# Patient Record
Sex: MALE | Race: WHITE | URBAN - METROPOLITAN AREA
[De-identification: names, ages, dates, MRNs, and addresses within clinical notes are randomized per-mention and may not be internally consistent; named-entity substitution may affect disease eponyms.]

---

## 2017-02-05 ENCOUNTER — TRANSFERRED RECORDS (OUTPATIENT)
Dept: HEALTH INFORMATION MANAGEMENT | Facility: CLINIC | Age: 62
End: 2017-02-05

## 2017-03-24 ENCOUNTER — RECORDS - HEALTHEAST (OUTPATIENT)
Dept: LAB | Facility: CLINIC | Age: 62
End: 2017-03-24

## 2017-03-24 LAB
CREAT SERPL-MCNC: 1.2 MG/DL (ref 0.7–1.3)
GFR SERPL CREATININE-BSD FRML MDRD: >60 ML/MIN/1.73M2

## 2017-07-06 ENCOUNTER — HOSPITAL ENCOUNTER (OUTPATIENT)
Dept: PHYSICAL THERAPY | Facility: CLINIC | Age: 62
Setting detail: THERAPIES SERIES
End: 2017-07-06
Attending: FAMILY MEDICINE
Payer: COMMERCIAL

## 2017-07-06 PROCEDURE — 97162 PT EVAL MOD COMPLEX 30 MIN: CPT | Mod: GP | Performed by: PHYSICAL THERAPIST

## 2017-07-06 PROCEDURE — 97110 THERAPEUTIC EXERCISES: CPT | Mod: GP | Performed by: PHYSICAL THERAPIST

## 2017-07-06 PROCEDURE — T1013 SIGN LANG/ORAL INTERPRETER: HCPCS | Mod: U3 | Performed by: PHYSICAL THERAPIST

## 2017-07-06 PROCEDURE — 40000719 ZZHC STATISTIC PT DEPARTMENT NEURO VISIT: Performed by: PHYSICAL THERAPIST

## 2017-07-07 NOTE — PROGRESS NOTES
07/06/17 1300   Quick Adds   Quick Adds Certification  (Medicaid [place])   Type of Visit Initial OP PT Evaluation       Present Yes  (Daughter here as well and speaks english/Romansh)   Language Mongolian   General Information   Start of Care Date 07/06/17   Referring Physician Dr. Fabienne Wallace   Orders Evaluate and Treat as Indicated   Additional Orders 12 visits   Order Date 06/08/17   Medical Diagnosis Interspinal Abscess and granuloma, incomplete paraplegia   Onset of illness/injury or Date of Surgery 02/05/17  (hospital admit)   Precautions/Limitations no known precautions/limitations   Surgical/Medical history reviewed Yes   Pertinent history of current problem (include personal factors and/or comorbidities that impact the POC) 60 yo male who was admitted to the hospital on 2-5-17 and was discharged on 2-21-17 secondary to a spinal abscess and possible psoas abscess. He had laminectomies to get to the site of infection at T2-6. In addition, he has a history of a lipoma in the R upper back. He was then seen at [facility] for rehabilitation. ACcording to his daughter, He was using a standing frame. He is starting to getmovement in the L toes and occasionally in the L ankle DFs. He uses a wheelchair and can transfer himself using the slide board. He gets himself into bed at night using a belt around his knees. He has assistance with leg exercises including hip abduction and adduction, knee and hip flexion and extension. He also had been workingon arm strengthening at [facility]. He is living with his daughter and her  and there is a ramp into the home. He lives on the main level. They do not have a stander at home. His daughter understands the importance of intense rehab within the first yr.    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 Rambler - everything on one floor   Current Assistive Devices Manual Wheel Chair   ADL Devices Shower/Tub Chair   Assistive Devices Comments none   Patient/Family Goals Statement Be able to walk, likes his job   Fall Risk Screen   Fall screen completed by PT   Per patient - Fall 2 or more times in past year? No   Per patient - Fall with injury in past year? No   Is patient a fall risk? Yes;Department fall risk interventions implemented   Fall screen comments Due lesion of spine   Pain   Patient currently in pain Yes   Pain location Area of the abscess removal   Pain rating now: 8/10, 3-4/10 sometimes - 10/10   Cognitive Status Examination   Orientation orientation to person, place and time   Follows Commands and Answers Questions 100% of the time   Personal Safety and Judgment intact   Memory intact   Observation   Observation no acute distress in manual wheel chair   Integumentary   Integumentary Comments well healed incision   Posture   Posture Comments fair trunk control in sitting.    Strength   Strength Comments decreased R shoulder ER 3-/5 due to pain, flexion, abduction, IR equal and 4+ to 5-/5. unable to contract R LE muscles. Able to wiggle L toes    Bed Mobility   Bed Mobility Comments needs equipment/person (apparently better at home due to wider bed vs plinth)   Transfer Skills   Transfer Comments independent with slide board transfer bed<->chair. His daughter emphasizes the need for detailed procedure   Locomotion   Wheel Chair Mobility Comments independent in dept   Planned Therapy Interventions   Planned Therapy Interventions Comment Work on strength and ROM to allow standing with progression to walking as he is able   Clinical Impression   Criteria for Skilled Therapeutic Interventions Met yes, treatment indicated   PT  Diagnosis incomplete paraplegia with difficulty standing   Influenced by the following impairments in ability to control legs   Functional limitations due to impairments walking, standing, bed mobility    Clinical Presentation Stable/Uncomplicated   Clinical Presentation Rationale neural injury, noting improvement   Clinical Decision Making (Complexity) Low complexity   Therapy Frequency 1 time/week   Predicted Duration of Therapy Intervention (days/wks) 12 visits/weeks   Risk & Benefits of therapy have been explained Yes   Patient, Family & other staff in agreement with plan of care Yes   Clinical Impression Comments 60 yo male here with his daughter. They want to be aggressive and push into standing and walking. I had recommended working on core, weight shifting, balance and bed mobility as well and they feel this is not needed at this time. We will ask for consent to get records from [facility] to work into goals. Will also recommend practicing trunk control at least in sitting to improve standing once ready.    Education Assessment   Preferred Learning Style Listening   Barriers to Learning Language  (has  present at session)   GOALS   PT Eval Goals 1   Goal 1   Goal Identifier Home program   Goal Description With assistance, Alireza will be able to complete a home program to guide him to standing with maximum assist of 1 safely with progression to as much independence as he is able   Target Date 09/29/17   Total Evaluation Time   Total Evaluation Time (Minutes) 30   Therapy Certification   Certification date from 07/06/17   Certification date to 09/28/17   Medical Diagnosis Interspinal Abscess and granuloma, incomplete paraplegia   Certification I certify the need for these services furnished under this plan of treatment and while under my care.  (Physician co-signature of this document indicates review and certification of the therapy plan).       Thank you for referring alireza  to [facility]  Rehabilitation Services - [facility] Beatrice, PT  [phone]

## 2017-07-07 NOTE — PROGRESS NOTES
[facility]        OUTPATIENT PHYSICAL THERAPY FUNCTIONAL EVALUATION  PLAN OF TREATMENT FOR OUTPATIENT REHABILITATION  (COMPLETE FOR INITIAL CLAIMS ONLY)  Patient's Last Name, First Name, M.I.  YOB: 1955  Alireza Meyer        Provider's Name   [facility]   Medical Record No.  [id number]     Start of Care Date:  07/06/17   Onset Date:  02/05/17 (hospital admit)   Type:     _X__PT   ____OT  ____SLP Medical Diagnosis:  Interspinal Abscess and granuloma, incomplete paraplegia     PT Diagnosis:  incomplete paraplegia with difficulty standing Visits from SOC:  1                              __________________________________________________________________________________  Plan of Treatment/Functional Goals:     Work on strength and ROM to allow standing with progression to walking as he is able        GOALS  Home program  With assistance, Alireza will be able to complete a home program to guide him to standing with maximum assist of 1 safely with progression to as much independence as he is able  09/29/17       Therapy Frequency:  1 time/week   Predicted Duration of Therapy Intervention:  12 visits/weeks    Yue Barron, PT                                    I CERTIFY THE NEED FOR THESE SERVICES FURNISHED UNDER        THIS PLAN OF TREATMENT AND WHILE UNDER MY CARE .             Physician Signature               Date    X_____________________________________________________                      Certification Date From:  07/06/17   Certification Date To:  09/28/17    Referring Provider:  Dr. Fabienne Wallace    Initial Assessment  See Epic Evaluation- Start of Care Date: 07/06/17

## 2017-07-12 ENCOUNTER — TELEPHONE (OUTPATIENT)
Dept: PHYSICAL THERAPY | Facility: CLINIC | Age: 62
End: 2017-07-12

## 2017-07-13 ENCOUNTER — HOSPITAL ENCOUNTER (OUTPATIENT)
Dept: PHYSICAL THERAPY | Facility: CLINIC | Age: 62
Setting detail: THERAPIES SERIES
End: 2017-07-13
Attending: FAMILY MEDICINE
Payer: COMMERCIAL

## 2017-07-13 PROCEDURE — 97530 THERAPEUTIC ACTIVITIES: CPT | Mod: GP | Performed by: PHYSICAL THERAPIST

## 2017-07-13 PROCEDURE — 97110 THERAPEUTIC EXERCISES: CPT | Mod: GP | Performed by: PHYSICAL THERAPIST

## 2017-07-13 PROCEDURE — 97112 NEUROMUSCULAR REEDUCATION: CPT | Mod: GP | Performed by: PHYSICAL THERAPIST

## 2017-07-13 PROCEDURE — T1013 SIGN LANG/ORAL INTERPRETER: HCPCS | Mod: U3 | Performed by: PHYSICAL THERAPIST

## 2017-07-13 PROCEDURE — 40000719 ZZHC STATISTIC PT DEPARTMENT NEURO VISIT: Performed by: PHYSICAL THERAPIST

## 2017-07-17 ENCOUNTER — HOSPITAL ENCOUNTER (OUTPATIENT)
Dept: PHYSICAL THERAPY | Facility: CLINIC | Age: 62
Setting detail: THERAPIES SERIES
End: 2017-07-17
Attending: FAMILY MEDICINE
Payer: COMMERCIAL

## 2017-07-17 PROCEDURE — T1013 SIGN LANG/ORAL INTERPRETER: HCPCS | Mod: U3 | Performed by: PHYSICAL THERAPIST

## 2017-07-17 PROCEDURE — 40000719 ZZHC STATISTIC PT DEPARTMENT NEURO VISIT: Performed by: PHYSICAL THERAPIST

## 2017-07-17 PROCEDURE — 97530 THERAPEUTIC ACTIVITIES: CPT | Mod: GP | Performed by: PHYSICAL THERAPIST

## 2017-07-17 PROCEDURE — 97110 THERAPEUTIC EXERCISES: CPT | Mod: GP | Performed by: PHYSICAL THERAPIST

## 2017-07-17 NOTE — ADDENDUM NOTE
Encounter addended by: Caridad Baum PT on: 7/17/2017  9:02 AM<BR>     Actions taken: Flowsheet data copied forward, Flowsheet accepted, Charge Capture section accepted

## 2017-07-20 ENCOUNTER — HOSPITAL ENCOUNTER (OUTPATIENT)
Dept: PHYSICAL THERAPY | Facility: CLINIC | Age: 62
Setting detail: THERAPIES SERIES
End: 2017-07-20
Attending: FAMILY MEDICINE
Payer: COMMERCIAL

## 2017-07-20 ENCOUNTER — TRANSFERRED RECORDS (OUTPATIENT)
Dept: HEALTH INFORMATION MANAGEMENT | Facility: CLINIC | Age: 62
End: 2017-07-20

## 2017-07-20 PROCEDURE — T1013 SIGN LANG/ORAL INTERPRETER: HCPCS | Mod: U3 | Performed by: PHYSICAL THERAPIST

## 2017-07-20 PROCEDURE — 40000719 ZZHC STATISTIC PT DEPARTMENT NEURO VISIT: Performed by: PHYSICAL THERAPIST

## 2017-07-20 PROCEDURE — 97110 THERAPEUTIC EXERCISES: CPT | Mod: GP | Performed by: PHYSICAL THERAPIST

## 2017-07-20 PROCEDURE — 97530 THERAPEUTIC ACTIVITIES: CPT | Mod: GP | Performed by: PHYSICAL THERAPIST

## 2017-07-24 ENCOUNTER — HOSPITAL ENCOUNTER (OUTPATIENT)
Dept: PHYSICAL THERAPY | Facility: CLINIC | Age: 62
Setting detail: THERAPIES SERIES
End: 2017-07-24
Attending: FAMILY MEDICINE
Payer: COMMERCIAL

## 2017-07-24 PROCEDURE — 97110 THERAPEUTIC EXERCISES: CPT | Mod: GP | Performed by: PHYSICAL THERAPIST

## 2017-07-24 PROCEDURE — 97530 THERAPEUTIC ACTIVITIES: CPT | Mod: GP | Performed by: PHYSICAL THERAPIST

## 2017-07-24 PROCEDURE — T1013 SIGN LANG/ORAL INTERPRETER: HCPCS | Mod: U3 | Performed by: PHYSICAL THERAPIST

## 2017-07-24 PROCEDURE — 40000719 ZZHC STATISTIC PT DEPARTMENT NEURO VISIT: Performed by: PHYSICAL THERAPIST

## 2017-08-11 ENCOUNTER — HOSPITAL ENCOUNTER (OUTPATIENT)
Dept: PHYSICAL THERAPY | Facility: CLINIC | Age: 62
Setting detail: THERAPIES SERIES
End: 2017-08-11
Attending: FAMILY MEDICINE
Payer: COMMERCIAL

## 2017-08-11 PROCEDURE — 97112 NEUROMUSCULAR REEDUCATION: CPT | Mod: GP | Performed by: PHYSICAL THERAPIST

## 2017-08-11 PROCEDURE — 97110 THERAPEUTIC EXERCISES: CPT | Mod: GP | Performed by: PHYSICAL THERAPIST

## 2017-08-11 PROCEDURE — 40000719 ZZHC STATISTIC PT DEPARTMENT NEURO VISIT: Performed by: PHYSICAL THERAPIST

## 2017-08-15 ENCOUNTER — HOSPITAL ENCOUNTER (OUTPATIENT)
Dept: PHYSICAL THERAPY | Facility: CLINIC | Age: 62
Setting detail: THERAPIES SERIES
End: 2017-08-15
Attending: FAMILY MEDICINE
Payer: COMMERCIAL

## 2017-08-15 PROCEDURE — 97162 PT EVAL MOD COMPLEX 30 MIN: CPT | Mod: GP

## 2017-08-15 PROCEDURE — 40000449 ZZHC STATISTIC PT VISIT, LYMPHEDEMA

## 2017-08-15 PROCEDURE — 97112 NEUROMUSCULAR REEDUCATION: CPT | Mod: GP

## 2017-08-17 NOTE — PROGRESS NOTES
08/15/17 1050   Quick Adds   Quick Adds Certification   Rehab Discipline   Discipline PT   Type of Visit   Type of visit Initial Edema Evaluation       present Yes   Language Slovenian   General Information   Start of care 08/15/17   Referring physician Dr. 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 He has right LE lymphedema along with the issues releated to paraplegia. He has a new WC. He is not sitting in it well due to pads on the pressure releif cushion . He is sliding forward.. the cushion needs maintenance ie kneeding to ge the jell put back into place for good pressure relief.   Prior level of functional mobility Prior to infection he was indep and ambulatory working at a job.   Prior treatment Elevation   Community support Personal care attendant;Family / friend caregiver   Patient role / employment history Disabled   Living environment House / [facility]   Living environment comments lives with daughter and her    Current assistive devices Transfer board;Manual wheel chair   Assistive device comments a shower chair is being ordered and letters of medical necessities written. It takes a while   Fall Screening   Fall screen completed by PT   Per patient, fall 2 or more times in past year? No   Per patient, fall with injury in past year? No   System Outcome Measures   Outcome Measures Lymphedema   Lymphedema Life Impact Scale (score range 0-72). A higher score indicates  greater impairment. 17   Subjective Report   Patient report of symptoms He and daughter report that the swelling was much more right after the infection and now with meds and some time he has less swelling. Plus he has been elevating. He sleeps in a regular bed .   Patient / Family Goals   Patient / family goals statement get edema under control    Pain   Patient currently in pain No   Cognitive Status   Orientation Orientation to person, place and time   Level of consciousness Alert   Follows commands and answers questions 100% of the time   Personal safety and judgement Intact   Memory Intact   Edema Exam / Assessment   Scar No   Dorsal pedal pulse Decreased   Stemmer sign Negative   Ulceration Yes   Ulceration comments heel wound due to pressure, bandaged   Volume LE   Right LE (mL) 6874.11   Left LE (mL) 6051.2   LE volume comparison RLE volume greater than LLE volume   Range of Motion   ROM No deficits were identified   Strength   Strength comments UE 4+ to 5/5,  LE tone and 2/5 LE in quad.    Posture   Posture Normal   Posture comments He was not in good posture  in his new manual chair. He has a solid back and a vidal contour cushion .  his LE and trunk seem to drift to the left when he is sitting. He had a thick pad on the cushion needed for incontinence. Insructed the need for the padding to be off the pressure relief cushion. He also had a chux on  the seat.    Activities of Daily Living   Activities of Daily Living needs assist with some things . He is using a sliding board transfer technique.    Bed Mobility   Bed mobility Pt is indep in bed but he has tone that needs to be managed . Daughter does some ROM.   Transfers   Transfers sliding board transfers    Gait / Locomotion   Gait / Locomotion unable   Sensory   Sensory perception comments decreased sensation LE   Vascular Assessment   Vascular Assessment Comments none   Coordination   Coordination comments UE no problem with coordination   Muscle Tone    Muscle tone comments significant LE tone into flexion.   Planned Edema Interventions   Planned edema interventions Manual lymph drainage;Gradient compression bandaging;Fit for compression garment   Clinical Impression   Criteria for skilled therapeutic intervention met Yes   Therapy diagnosis Parapalegic positioning rehab  and lymphedema managment    Functional limitations due to impairments / conditions edema is putting him at risk for infection in edematous tissue so needs to be managed. He presently is not sitting correctly inhis new chair so will consult his WC vendor., potential for wounds from pressure   Clinical Presentation Evolving/Changing   Clinical Decision Making (Complexity) Moderate complexity   Treatment frequency (4x/weekx1 week then 1x/week)   Treatment duration 90 days   Patient / family and/or staff in agreement with plan of care Yes   Risks and benefits of therapy have been explained Yes   Education Assessment   Preferred learning style Listening;Demonstration;Pictures / video   Barriers to learning No barriers   Goals   Edema Eval Goals 1;2;3   Goal 1   Goal identifier 1   Goal description Pt will have edema reduced 10% and correct fitting garments in place.    Target date 11/11/17   Goal 2   Goal identifier 2   Goal description Pt and daughter will be indep with managing risk factors for edema plus pt will be indep wiht self manual lymph drainage.   Target date 11/11/17   Goal 3   Goal identifier 3   Goal description Pt will be in correct position in his WC to protect from further pressure areas plus he will be indep with unloading pressure areas.   Target date 11/11/17   Total Evaluation Time   Total evaluation time 45   Certification   Certification date from 08/15/17   Certification date to 11/11/17   Medical Diagnosis right leg swelling, paraplegic,   Certification I certify the need for these services furnished under this plan of treatment and while under my care.  (Physician  co-signature of this document indicates review and certification of the therapy plan).

## 2017-08-22 ENCOUNTER — HOSPITAL ENCOUNTER (OUTPATIENT)
Dept: PHYSICAL THERAPY | Facility: CLINIC | Age: 62
Setting detail: THERAPIES SERIES
End: 2017-08-22
Attending: FAMILY MEDICINE
Payer: COMMERCIAL

## 2017-08-22 PROCEDURE — 97140 MANUAL THERAPY 1/> REGIONS: CPT | Mod: GP

## 2017-08-22 PROCEDURE — 40000449 ZZHC STATISTIC PT VISIT, LYMPHEDEMA

## 2017-08-23 ENCOUNTER — HOSPITAL ENCOUNTER (OUTPATIENT)
Dept: PHYSICAL THERAPY | Facility: CLINIC | Age: 62
Setting detail: THERAPIES SERIES
End: 2017-08-23
Attending: FAMILY MEDICINE
Payer: COMMERCIAL

## 2017-08-23 PROCEDURE — 97140 MANUAL THERAPY 1/> REGIONS: CPT | Mod: GP

## 2017-08-23 PROCEDURE — 40000449 ZZHC STATISTIC PT VISIT, LYMPHEDEMA

## 2017-08-24 ENCOUNTER — HOSPITAL ENCOUNTER (OUTPATIENT)
Dept: PHYSICAL THERAPY | Facility: CLINIC | Age: 62
Setting detail: THERAPIES SERIES
End: 2017-08-24
Attending: FAMILY MEDICINE
Payer: COMMERCIAL

## 2017-08-24 PROCEDURE — 97140 MANUAL THERAPY 1/> REGIONS: CPT | Mod: GP

## 2017-08-24 PROCEDURE — 40000449 ZZHC STATISTIC PT VISIT, LYMPHEDEMA

## 2017-08-24 PROCEDURE — 97542 WHEELCHAIR MNGMENT TRAINING: CPT | Mod: GP

## 2017-08-25 ENCOUNTER — HOSPITAL ENCOUNTER (OUTPATIENT)
Dept: PHYSICAL THERAPY | Facility: CLINIC | Age: 62
Setting detail: THERAPIES SERIES
End: 2017-08-25
Attending: FAMILY MEDICINE
Payer: COMMERCIAL

## 2017-08-25 PROCEDURE — 97530 THERAPEUTIC ACTIVITIES: CPT | Mod: GP

## 2017-08-25 PROCEDURE — 97140 MANUAL THERAPY 1/> REGIONS: CPT | Mod: GP

## 2017-08-25 PROCEDURE — 40000449 ZZHC STATISTIC PT VISIT, LYMPHEDEMA

## 2017-08-25 PROCEDURE — 97542 WHEELCHAIR MNGMENT TRAINING: CPT | Mod: GP

## 2017-08-30 NOTE — ADDENDUM NOTE
Encounter addended by: Arlin Powers, PT on: 8/30/2017 11:10 AM<BR>     Actions taken: Flowsheet data copied forward, Flowsheet accepted

## 2017-08-30 NOTE — ADDENDUM NOTE
Encounter addended by: Arlin Powers, PT on: 8/30/2017 11:09 AM<BR>     Actions taken: Flowsheet data copied forward, Flowsheet accepted

## 2017-08-30 NOTE — ADDENDUM NOTE
Encounter addended by: Arlin Powers, PT on: 8/30/2017 11:14 AM<BR>     Actions taken: Flowsheet data copied forward, Flowsheet accepted

## 2017-08-30 NOTE — ADDENDUM NOTE
Encounter addended by: Arlin Powers, PT on: 8/30/2017 11:13 AM<BR>     Actions taken: Flowsheet data copied forward, Flowsheet accepted

## 2017-09-05 NOTE — PROGRESS NOTES
Outpatient Physical Therapy Discharge Note     Patient: Alireza Meyer  : 1955    Beginning/End Dates of Reporting Period:  8/15/2017 to 2017    Referring Provider: Dr. Sav Chavez    Therapy Diagnosis: Lymphedema bilateral LE     Client Self Report:  Pt reported having less reactivity to touch ie less tone with the gradient compression bandages in place. He reduced quickly and velcro garments were ordered. He reported that he was going to see relatives in [place] and leaving the weekend after last Rx. The garments and bandaging is a question if he will get it managed okay. Our fitter was going to pursue having his daughter here mail the garments to him. Hopefully if he runs into trouble in [place] he will seek out a Certified Lymphedema Therapist. THis therapist recommended that.     Objective Measurements:      See girth measurements      Outcome Measures (most recent score):   LLIS was 17      Goals:  Goal Identifier 1   Goal Description Pt will have edema reduced 10% and correct fitting garments in place.    Target Date 17   Date Met      Progress:Garments were on order but he did not wait for them. He was responding well with the gradient compression bandaging. He is going to relatives who have not been instructed in gradient compression bandaging. Written instructions sent. Our fitter to connect with his daughter her in town to try to get the garments to him but fit won't be guaranteed.     Goal Identifier 2   Goal Description Pt and daughter will be indep with managing risk factors for edema plus pt will be indep wiht self manual lymph drainage.   Target Date 17   Date Met      Progress:Daughter did not come to Rx's to learn more than from first session. Written instructions given via booklet. It was in English not Georgian.     Goal Identifier 3   Goal Description Pt will be in correct position in his WC to protect from further pressure areas plus he will be indep with unloading pressure  areas.   Target Date 11/11/17   Date Met      Progress:A technician from [facility] came to adjust his new Wheelchair and we were able to get much better sitting posture. He demonstrated off loading per self      Plan:  Discharge from therapy.    Discharge:    Reason for Discharge: Patient chooses to discontinue therapy.    Equipment Issued: gradient compression bandaging were given and velcro garments were ordered.     Discharge Plan: Patient to continue home program.

## 2017-09-05 NOTE — ADDENDUM NOTE
Encounter addended by: Arlin Powers, PT on: 9/5/2017  9:00 AM<BR>     Actions taken: Sign clinical note, Episode resolved

## 2017-10-17 NOTE — ADDENDUM NOTE
Encounter addended by: Caridad Baum PT on: 10/17/2017 11:51 AM<BR>     Actions taken: Flowsheet accepted

## 2017-10-31 NOTE — DISCHARGE SUMMARY
Outpatient Physical Therapy Discharge Note     Patient: Alireza Meyer  : 1955    Beginning/End Dates of Reporting Period:  6 visits for neuro rehab (he was also being seen for lymphedema) between 17 and 17    Referring Provider: Dr. Fabienne Wallace    Therapy Diagnosis: incomplete paraplegia with difficulty standing     Client Self Report: At the time of his last session, he reported: 7/10 pain in the R upper back where the lipoma was removed. They are working on the home program 1-2 times per day. They had 2 appt to meet the PCA and the PCA did not show. They are very frustrated. Leonor his daughter is going to be out of town and he will be staying with his sister in [place] for several weeks. He will be leaving around 17. He may be gone up to one month. Decreased swelling noted. He wakes frequently at night due to spasms.   He has left the state to be with other relatives.     Objective Measurements:  At the time of his last session, it was taking multiple attempts for sit->stand. We discussed options for wheelchair stander for home.      Goals:  Goal Identifier Home program   Goal Description With assistance, Alireza will be able to complete a home program to guide him to standing with maximum assist of 1 safely with progression to as much independence as he is able (Completing ex 1 -2 times per day)   Target Date 17   Date Met      Progress:         Progress Toward Goals:   Not assessed this period.      Plan:  Discharge from therapy.    Discharge:    Reason for Discharge: out of state since August    Equipment Issued: none    Discharge Plan: Patient to continue home program.    Thank you for referring  Alireza to [facility] Stella Barron, PT  [phone]

## 2017-10-31 NOTE — ADDENDUM NOTE
Encounter addended by: Yue Barron PT on: 10/31/2017  2:10 PM<BR>     Actions taken: Sign clinical note, Episode resolved